# Patient Record
Sex: FEMALE | Race: WHITE | Employment: FULL TIME | URBAN - METROPOLITAN AREA
[De-identification: names, ages, dates, MRNs, and addresses within clinical notes are randomized per-mention and may not be internally consistent; named-entity substitution may affect disease eponyms.]

---

## 2017-01-13 ENCOUNTER — THERAPY VISIT (OUTPATIENT)
Dept: PHYSICAL THERAPY | Facility: CLINIC | Age: 32
End: 2017-01-13
Payer: COMMERCIAL

## 2017-01-13 DIAGNOSIS — N94.10 DYSPAREUNIA, FEMALE: Primary | ICD-10-CM

## 2017-01-13 DIAGNOSIS — M99.05 SOMATIC DYSFUNCTION OF PELVIC REGION: ICD-10-CM

## 2017-01-13 DIAGNOSIS — N39.3 FEMALE STRESS INCONTINENCE: ICD-10-CM

## 2017-01-13 PROCEDURE — 97161 PT EVAL LOW COMPLEX 20 MIN: CPT | Mod: GP | Performed by: PHYSICAL THERAPIST

## 2017-01-13 PROCEDURE — 97530 THERAPEUTIC ACTIVITIES: CPT | Mod: GP | Performed by: PHYSICAL THERAPIST

## 2017-01-13 NOTE — PROGRESS NOTES
[facility]  System  Physical Exam  General   ROS            Physical Therapy Initial Examination/Evaluation January 13, 2017   Ceci Major is a 31 year old female referred to physical therapy by Dr. Camilla Weber for treatment of dyspareunia with Precautions/Restrictions/MD instructions E&T; 6 visits   Therapist Assessment:   Clinical Impression: Pt presents to [facility] [place] with primary complaint of pelvic pain and occasional incontinence.  Per clinical examination, pt with decreased power and endurance in pelvic floor.  Compensation required from adductors, glutes, and abdominals to complete a pelvic floor contraction.  Reproduction of pain present with internal exam.  Evaluation ongoing as pt unaware of 80 minute session time and needing to return to work  Pt will benefit from skilled physical therapy for stretching and strengthening program as appropriate as well as core stabilization program post partum.      Subjective: Pelvic pain during exercise, especially with wide leg movements. Pain during intercourse, unable to successfully complete. Was able to have orgasm with some muscular pain and soreness following.   DOI/onset: 1/13/17 (PT) 1/9/17 (MD)   Mechanism of injury: Child birth    Related PMH: Chronic neck issues that also worsened during preganancy; some low back pain prior to preganancy Previous treatment: chiropractic   Chief Complaint: pelvic pain    Pain: rest 0 /10, activity 7/10  Described as: Burning Alleviated by: Stopping the activty Progression of symptoms since initial onset: Worse Time of day when pain is worse: none in particular   Sleeping: No issues; just getting up with the baby   Social history: Lives with  and daugther; returned to work part-time this week from maternity leave   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 No    Do you leak with cough,sneeze, jumping, running?Yes   Any other activities that cause leaking? No   Do you have triggers that make you feel you can't wait to go to the bathroom? No     Type of pad and number used per day?  0  When you leak what is the amount? Small     How long can you delay the need to urinate? Not sure; 1 hour.   How many times do you get up to urinate at night? 0     Can you stop the flow of urine when on the toilet? Unsure  Is the volume of urine passed usually: medium. (8sec rule=  250ml with average bladder storing  400-600ml)    Do you strain to pass urine? Sometimes  Do you have a slow or hesitant urinary stream? No  Do you have difficulty initiating the urine stream? Yes  Is urination painful?  No    How many bladder infections have you had in last 12 months? 0    Fluid intake(one glass is 8oz or one cup) 6 glasses/day, 0 caffinated glasses/day  0 alcohol glasses/day.    Bowel habits:  Frequency of bowel movements? 1 times a day  Consistancy of stool? hard, [place] Stool Scale 1  Do you ignore the urge to defecate? No  Do you strain to pass stool?  Sometimes    Pelvic Pain:  Do you have any pelvic pain with intercourse, exams, use of tampons? Yes  Is initial penetration during intercourse painful? Yes  Is deeper penetration painful? Unable to get to deeper penetration since childbirth  Do you use lubricant?   Yes What kind? Uberlube      Given birth? Yes Any complications? Yes; traumatic delivery, occurred quickly; did get epidural. Pushed constantly for 15 minutes but unable to feel due to epidural  # of vaginal delieveries? 1   # of C-sections? 0  # of episiotomies?  1.  Are you sexually active? Trying to be, but unable due to increased pain   Have you ever been worried for your physical safety? No  Any abdominal or pelvic surgeries?  No   Are you having any regular exercise? Yes  Have you practiced the PF(kegel) exercises for 4 or more weeks? No   Changed diet lately? No    OBSERVATION  Lumbar Posture: Negative  Introitus: Scar able to be seen from episiotomy      MUSCLE PERFORMANCE    Baseline PF tone:hypo  PF Tone with cough: bulge  Valsalva: bulge  PF Response quality: sluggish  PF Power: Center: 2   Endurance: Maximum contraction in seconds: 3-5s but with compensation from abdominals, glutes, adductors  # of endurance contractions before fatigue: NT  Quick contraction repetitions prior to fatigue: 5 with compensation   Specificity/accessory muscles: Glutes, abdominals, adductors      PALPATION: Reproduction of symptoms with digital evaluations; increased hypertonicity on the L; pain at both superficial and deep layers    Therapeutic Activity (20 min): Today's session consisted of education regarding pelvic floor muscle anatomy, normal bladder function, urge suppression techniques and/or relaxation techniques as indicated.  Pt was instructed in the pathoanatomy of the pelvic floor utilizing pelvic model.  We discussed what pelvic floor physical therapy is, components of exam, and typical patient progression.  Pt was told that she was in control of exam progression, and if at any time was uncomfortable and wished to discontinue we could.     Assessment/Plan:      Patient is a 31 year old female with pelvic complaints.    Patient has the following significant findings with corresponding treatment plan.                Diagnosis 1:  Dyspareunia, pelvic floor dysfunction  Pain -  manual therapy, education and home program  Decreased ROM/flexibility - manual therapy, therapeutic exercise and home program  Decreased strength - therapeutic exercise, therapeutic activities and home  program  Impaired muscle performance - biofeedback, neuro re-education and home program  Decreased function - therapeutic activities and home program    Therapy Evaluation Codes:   1) History comprised of:   Personal factors that impact the plan of care:      Past/current experiences and Time since onset of symptoms.    Comorbidity factors that impact the plan of care are:      Bowel/bladder changes, Implanted device and Weakness.     Medications impacting care: None.  2) Examination of Body Systems comprised of:   Body structures and functions that impact the plan of care:      Pelvis.   Activity limitations that impact the plan of care are:      Sports, Squatting/kneeling, Margaret and Stress incontinence.  3) Clinical presentation characteristics are:   Stable/Uncomplicated.  4) Decision-Making    Low complexity using standardized patient assessment instrument and/or measureable assessment of functional outcome.  Cumulative Therapy Evaluation is: Low complexity.    Previous and current functional limitations:  (See Goal Flow Sheet for this information)    Short term and Long term goals: (See Goal Flow Sheet for this information)     Communication ability:  Patient appears to be able to clearly communicate and understand verbal and written communication and follow directions correctly.  Treatment Explanation - The following has been discussed with the patient:   RX ordered/plan of care  Anticipated outcomes  Possible risks and side effects  This patient would benefit from PT intervention to resume normal activities.   Rehab potential is good.    Frequency:  1 X week, once daily  Duration:  for 6 weeks  Discharge Plan:  Achieve all LTG.  Independent in home treatment program.  Reach maximal therapeutic benefit.    Please refer to the daily flowsheet for treatment today, total treatment time and time spent performing 1:1 timed codes.

## 2017-01-14 PROBLEM — M99.05 SOMATIC DYSFUNCTION OF PELVIC REGION: Status: ACTIVE | Noted: 2017-01-14

## 2017-01-14 PROBLEM — N94.10 DYSPAREUNIA, FEMALE: Status: ACTIVE | Noted: 2017-01-14

## 2017-01-14 PROBLEM — N39.3 FEMALE STRESS INCONTINENCE: Status: ACTIVE | Noted: 2017-01-14

## 2017-01-20 ENCOUNTER — THERAPY VISIT (OUTPATIENT)
Dept: PHYSICAL THERAPY | Facility: CLINIC | Age: 32
End: 2017-01-20
Payer: COMMERCIAL

## 2017-01-20 DIAGNOSIS — N94.10 DYSPAREUNIA, FEMALE: ICD-10-CM

## 2017-01-20 DIAGNOSIS — N39.3 FEMALE STRESS INCONTINENCE: Primary | ICD-10-CM

## 2017-01-20 DIAGNOSIS — M99.05 SOMATIC DYSFUNCTION OF PELVIC REGION: ICD-10-CM

## 2017-01-20 PROCEDURE — 97112 NEUROMUSCULAR REEDUCATION: CPT | Mod: GP | Performed by: PHYSICAL THERAPIST

## 2017-01-20 PROCEDURE — 97140 MANUAL THERAPY 1/> REGIONS: CPT | Mod: GP | Performed by: PHYSICAL THERAPIST

## 2017-01-27 ENCOUNTER — THERAPY VISIT (OUTPATIENT)
Dept: PHYSICAL THERAPY | Facility: CLINIC | Age: 32
End: 2017-01-27
Payer: COMMERCIAL

## 2017-01-27 DIAGNOSIS — N39.3 FEMALE STRESS INCONTINENCE: ICD-10-CM

## 2017-01-27 DIAGNOSIS — M99.05 SOMATIC DYSFUNCTION OF PELVIC REGION: Primary | ICD-10-CM

## 2017-01-27 DIAGNOSIS — N94.10 DYSPAREUNIA, FEMALE: ICD-10-CM

## 2017-01-27 PROCEDURE — 97112 NEUROMUSCULAR REEDUCATION: CPT | Mod: GP | Performed by: PHYSICAL THERAPIST

## 2017-01-27 PROCEDURE — 97140 MANUAL THERAPY 1/> REGIONS: CPT | Mod: GP | Performed by: PHYSICAL THERAPIST

## 2017-02-03 ENCOUNTER — THERAPY VISIT (OUTPATIENT)
Dept: PHYSICAL THERAPY | Facility: CLINIC | Age: 32
End: 2017-02-03
Payer: COMMERCIAL

## 2017-02-03 DIAGNOSIS — N39.3 FEMALE STRESS INCONTINENCE: ICD-10-CM

## 2017-02-03 DIAGNOSIS — N94.10 DYSPAREUNIA, FEMALE: ICD-10-CM

## 2017-02-03 DIAGNOSIS — M99.05 SOMATIC DYSFUNCTION OF PELVIC REGION: Primary | ICD-10-CM

## 2017-02-03 PROCEDURE — 97112 NEUROMUSCULAR REEDUCATION: CPT | Mod: GP | Performed by: PHYSICAL THERAPIST

## 2017-02-03 PROCEDURE — 97140 MANUAL THERAPY 1/> REGIONS: CPT | Mod: GP | Performed by: PHYSICAL THERAPIST

## 2017-02-16 ENCOUNTER — THERAPY VISIT (OUTPATIENT)
Dept: PHYSICAL THERAPY | Facility: CLINIC | Age: 32
End: 2017-02-16
Payer: COMMERCIAL

## 2017-02-16 DIAGNOSIS — N94.10 DYSPAREUNIA, FEMALE: Primary | ICD-10-CM

## 2017-02-16 DIAGNOSIS — N39.3 FEMALE STRESS INCONTINENCE: ICD-10-CM

## 2017-02-16 DIAGNOSIS — M99.05 SOMATIC DYSFUNCTION OF PELVIC REGION: ICD-10-CM

## 2017-02-16 PROCEDURE — 97140 MANUAL THERAPY 1/> REGIONS: CPT | Mod: GP | Performed by: PHYSICAL THERAPIST

## 2017-02-16 PROCEDURE — 97112 NEUROMUSCULAR REEDUCATION: CPT | Mod: GP | Performed by: PHYSICAL THERAPIST

## 2017-03-01 ENCOUNTER — OFFICE VISIT (OUTPATIENT)
Dept: URGENT CARE | Facility: URGENT CARE | Age: 32
End: 2017-03-01
Payer: COMMERCIAL

## 2017-03-01 VITALS
SYSTOLIC BLOOD PRESSURE: 100 MMHG | WEIGHT: 150.8 LBS | TEMPERATURE: 97.5 F | OXYGEN SATURATION: 98 % | DIASTOLIC BLOOD PRESSURE: 58 MMHG | HEART RATE: 54 BPM | BODY MASS INDEX: 25.09 KG/M2

## 2017-03-01 DIAGNOSIS — J20.9 ACUTE BRONCHITIS, UNSPECIFIED ORGANISM: ICD-10-CM

## 2017-03-01 DIAGNOSIS — R05.9 COUGH: ICD-10-CM

## 2017-03-01 DIAGNOSIS — J00 ACUTE RHINITIS: Primary | ICD-10-CM

## 2017-03-01 PROCEDURE — 99213 OFFICE O/P EST LOW 20 MIN: CPT | Performed by: FAMILY MEDICINE

## 2017-03-01 RX ORDER — AMOXICILLIN 875 MG
875 TABLET ORAL 2 TIMES DAILY
Qty: 20 TABLET | Refills: 0 | Status: SHIPPED | OUTPATIENT
Start: 2017-03-01 | End: 2017-03-11

## 2017-03-01 NOTE — MR AVS SNAPSHOT
After Visit Summary   3/1/2017    Ceci Major    MRN: [id number]           Patient Information     Date Of Birth          1985        Visit Information        Provider Department      3/1/2017 9:15 AM Tye Moise MD [facility]        Today's Diagnoses     Acute rhinitis    -  1    Cough        Acute bronchitis, unspecified organism          Care Instructions    Steam, Humidifier for the nose, sinuses and lungs    Fill the prescription for Amoxicillin if your sinus symptoms fail to improve in 3-4 days.     Saline nasal rinses     Drink plenty of water    Dextromethorphan cough syrup for cough    Go to the ER if you develop worsening shortness of breath, increasing fevers.      Follow up with your primary care provider if not better in 10 days.           Follow-ups after your visit        Your next 10 appointments already scheduled     Mar 10, 2017  8:10 AM CST   MAGDALENO For Women Only with Lara Hargrove PT   [facility] ([facility])    [address] [place] [id number] [phone]              Who to contact     If you have questions or need follow up information about today's clinic visit or your schedule please contact [facility] directly at [phone].  Normal or non-critical lab and imaging results will be communicated to you by MyChart, letter or phone within 4 business days after the clinic has received the results. If you do not hear from us within 7 days, please contact the clinic through MyChart or phone. If you have a critical or abnormal lab result, we will notify you by phone as soon as possible.  Submit refill requests through Storie or call your pharmacy and they will forward the refill request to us. Please allow 3 business days for your refill to be completed.          Additional Information About Your Visit        [employer] Information     Storie lets you send  "messages to your doctor, view your test results, renew your prescriptions, schedule appointments and more. To sign up, go to [email or web address] . Click on \"Log in\" on the left side of the screen, which will take you to the Welcome page. Then click on \"Sign up Now\" on the right side of the page.     You will be asked to enter the access code listed below, as well as some personal information. Please follow the directions to create your username and password.     Your access code is: [email or web address]  Expires: 2017  2:49 PM     Your access code will  in 90 days. If you need help or a new code, please call your [place] clinic or [phone].        Care EveryWhere ID     This is your Care EveryWhere ID. This could be used by other organizations to access your [place] medical records  [id number]        Your Vitals Were     Pulse Temperature Pulse Oximetry Breastfeeding? BMI (Body Mass Index)       54 97.5  F (36.4  C) (Tympanic) 98% Yes 25.09 kg/m2        Blood Pressure from Last 3 Encounters:   17 100/58   14 102/67    Weight from Last 3 Encounters:   17 150 lb 12.8 oz (68.4 kg)   14 153 lb 12.8 oz (69.8 kg)              Today, you had the following     No orders found for display         Today's Medication Changes          These changes are accurate as of: 3/1/17  9:49 AM.  If you have any questions, ask your nurse or doctor.               Start taking these medicines.        Dose/Directions    amoxicillin 875 MG tablet   Commonly known as:  AMOXIL   Used for:  Acute rhinitis   Started by:  Tye Moise MD        Dose:  875 mg   Take 1 tablet (875 mg) by mouth 2 times daily for 10 days   Quantity:  20 tablet   Refills:  0            Where to get your medicines      Some of these will need a paper prescription and others can be bought over the counter.  Ask your nurse if you have questions.     Bring a paper prescription for each of these medications     amoxicillin 875 MG " tablet                Primary Care Provider Office Phone # Fax #    Camilla Weber [phone]       [facility] [address] [place] [address]        Thank you!     Thank you for choosing [facility]  for your care. Our goal is always to provide you with excellent care. Hearing back from our patients is one way we can continue to improve our services. Please take a few minutes to complete the written survey that you may receive in the mail after your visit with us. Thank you!             Your Updated Medication List - Protect others around you: Learn how to safely use, store and throw away your medicines at www.disposemymeds.org.          This list is accurate as of: 3/1/17  9:49 AM.  Always use your most recent med list.                   Brand Name Dispense Instructions for use    adapalene 0.1 % cream    DIFFERIN    45 g    Apply topically At Bedtime       amoxicillin 875 MG tablet    AMOXIL    20 tablet    Take 1 tablet (875 mg) by mouth 2 times daily for 10 days       Ciclopirox 8 % Kit    CICLOPIROX TREATMENT    1 kit    Externally apply 1 Application topically At Bedtime       clindamycin 1 % lotion    CLEOCIN T    60 mL    Apply topically daily       hydrocortisone 0.2 % cream    WESTCORT    45 g    Apply topically 2 times daily Up to 2 weeks to red areas on upper arms, up to 2 weeks.       PRENATAL ADVANTAGE PO      Take by mouth daily       tretinoin 0.05 % cream    RETIN-A    45 g    Spread a pea size amount into affected area topically at bedtime.  Use sunscreen SPF>20.

## 2017-03-01 NOTE — PATIENT INSTRUCTIONS
Steam, Humidifier for the nose, sinuses and lungs    Fill the prescription for Amoxicillin if your sinus symptoms fail to improve in 3-4 days.     Saline nasal rinses     Drink plenty of water    Dextromethorphan cough syrup for cough    Go to the ER if you develop worsening shortness of breath, increasing fevers.      Follow up with your primary care provider if not better in 10 days.

## 2017-03-01 NOTE — PROGRESS NOTES
SUBJECTIVE:   Ceci Major is a 31 year old female--she currently breastfeeds-- presenting with a chief complaint of cough (sometimes productive of green-yellow phlegm, sometimes clear phlegm, severe coughing attacks), blood in the nose (sometimes), fevers up to 100.5 F.  No h/o lung problems.    Onset of symptoms was two days ago for the cough and fevers, one week for the other symptoms. .    Course of illness is still present. .      Current and Associated symptoms: as listed above.   Treatment measures tried include Advil (last taken today at 6:30 am).  Predisposing factors include patient's daughter was diagnosed with pneumonia two .    Past Medical History   Diagnosis Date     IUD (intrauterine device) in place      Mirena.     Current Outpatient Prescriptions   Medication Sig Dispense Refill     Prenatal Vit-DSS-Fe Cbn-FA (PRENATAL ADVANTAGE PO) Take by mouth daily       clindamycin (CLEOCIN T) 1 % lotion Apply topically daily (Patient not taking: Reported on 3/1/2017) 60 mL 3     hydrocortisone (WESTCORT) 0.2 % cream Apply topically 2 times daily Up to 2 weeks to red areas on upper arms, up to 2 weeks. (Patient not taking: Reported on 3/1/2017) 45 g 3     tretinoin (RETIN-A) 0.05 % cream Spread a pea size amount into affected area topically at bedtime.  Use sunscreen SPF>20. (Patient not taking: Reported on 3/1/2017) 45 g 11     Ciclopirox (CICLOPIROX TREATMENT) 8 % KIT Externally apply 1 Application topically At Bedtime (Patient not taking: Reported on 3/1/2017) 1 kit 11     adapalene (DIFFERIN) 0.1 % cream Apply topically At Bedtime (Patient not taking: Reported on 3/1/2017) 45 g 3     Social History   Substance Use Topics     Smoking status: Never Smoker     Smokeless tobacco: Never Used     Alcohol use Yes      Comment: 6 drinks per week       ROS:  Review of systems negative except as stated above.    OBJECTIVE  :/58 (BP Location: Right arm, Patient Position: Chair, Cuff Size: Adult  Regular)  Pulse 54  Temp 97.5  F (36.4  C) (Tympanic)  Wt 150 lb 12.8 oz (68.4 kg)  SpO2 98%  Breastfeeding? Yes  BMI 25.09 kg/m2  GENERAL APPEARANCE: healthy, alert and no distress.  No acute respiratory distress.    HENT: TM's normal bilaterally, nasal turbinates erythematous, swollen and oral mucous membranes moist, no erythema noted  NECK: supple, nontender, no lymphadenopathy  RESP: lungs clear to auscultation - no rales, rhonchi or wheezes  CV: regular rates and rhythm, normal S1 S2, no murmur noted  SKIN: no suspicious lesions or rashes    ASSESSMENT:  Cough  Rhinitis  Acute Bronchitis    PLAN:  Steam, Humidifier  Dextromethorphan syrup for cough  Fill a printed prescription for Amoxicillin if the sinus symptoms fail to improve in 3-4 days.   Go to the emergency room if there is worsening shortness of breath and worsening fever.   follow up with the primary care provider if not better in 10 days.   See orders in Epic    Tye Moise MD

## 2017-03-01 NOTE — NURSING NOTE
"Chief Complaint   Patient presents with     Urgent Care     Cough     Pt states has cough and blood in nose x 3 days. States daughter was just diagnosed with pneumonia. Pt has taken advil.        Initial /58 (BP Location: Right arm, Patient Position: Chair, Cuff Size: Adult Regular)  Pulse 54  Temp 97.5  F (36.4  C) (Tympanic)  Wt 150 lb 12.8 oz (68.4 kg)  SpO2 98%  Breastfeeding? Yes  BMI 25.09 kg/m2 Estimated body mass index is 25.09 kg/(m^2) as calculated from the following:    Height as of 3/5/14: 5' 5\" (1.651 m).    Weight as of this encounter: 150 lb 12.8 oz (68.4 kg).  Medication Reconciliation: unable or not appropriate to perform   Marissa Quintanilla CMA (AAMA) 3/1/2017 9:21 AM    "

## 2017-03-10 ENCOUNTER — THERAPY VISIT (OUTPATIENT)
Dept: PHYSICAL THERAPY | Facility: CLINIC | Age: 32
End: 2017-03-10
Payer: COMMERCIAL

## 2017-03-10 DIAGNOSIS — M99.05 SOMATIC DYSFUNCTION OF PELVIC REGION: ICD-10-CM

## 2017-03-10 DIAGNOSIS — N94.10 DYSPAREUNIA, FEMALE: ICD-10-CM

## 2017-03-10 DIAGNOSIS — N39.3 FEMALE STRESS INCONTINENCE: ICD-10-CM

## 2017-03-10 PROCEDURE — 97112 NEUROMUSCULAR REEDUCATION: CPT | Mod: GP | Performed by: PHYSICAL THERAPIST

## 2017-03-10 PROCEDURE — 97140 MANUAL THERAPY 1/> REGIONS: CPT | Mod: GP | Performed by: PHYSICAL THERAPIST

## 2017-03-10 NOTE — MR AVS SNAPSHOT
"              After Visit Summary   3/10/2017    Ceci Major    MRN: [id number]           Patient Information     Date Of Birth          1985        Visit Information        Provider Department      3/10/2017 8:10 AM Lara Hargrove PT [facility]        Today's Diagnoses     Somatic dysfunction of pelvic region        Female stress incontinence        Dyspareunia, female           Follow-ups after your visit        Your next 10 appointments already scheduled     Apr 14, 2017  8:10 AM CDT   MAGDALENO For Women Only with Lara Hargrove PT   [facility]    [address] [place] [id number] [phone]              Who to contact     If you have questions or need follow up information about today's clinic visit or your schedule please contact [facility] directly at [phone].  Normal or non-critical lab and imaging results will be communicated to you by MyChart, letter or phone within 4 business days after the clinic has received the results. If you do not hear from us within 7 days, please contact the clinic through [employer] or phone. If you have a critical or abnormal lab result, we will notify you by phone as soon as possible.  Submit refill requests through Going or call your pharmacy and they will forward the refill request to us. Please allow 3 business days for your refill to be completed.          Additional Information About Your Visit        [employer] Information     Going lets you send messages to your doctor, view your test results, renew your prescriptions, schedule appointments and more. To sign up, go to [email or web address] . Click on \"Log in\" on the left side of the screen, which will take you to the Welcome page. Then click on \"Sign up Now\" on the right side of the page.     You will be asked to enter the access code listed " below, as well as some personal information. Please follow the directions to create your username and password.     Your access code is: [email or web address]  Expires: 2017  2:49 PM     Your access code will  in 90 days. If you need help or a new code, please call your [place] clinic or [phone].        Care EveryWhere ID     This is your Care EveryWhere ID. This could be used by other organizations to access your [place] medical records  [id number]         Blood Pressure from Last 3 Encounters:   17 100/58   14 102/67    Weight from Last 3 Encounters:   17 68.4 kg (150 lb 12.8 oz)   14 69.8 kg (153 lb 12.8 oz)              We Performed the Following     MAGDALENO PROGRESS NOTES REPORT     MANUAL THER TECH,1+REGIONS,EA 15 MIN     NEUROMUSCULAR RE-EDUCATION        Primary Care Provider Office Phone # Fax #    Camilla Weber [phone]       [facility] [address] [facility] [address]        Thank you!     Thank you for choosing [facility] [place]  for your care. Our goal is always to provide you with excellent care. Hearing back from our patients is one way we can continue to improve our services. Please take a few minutes to complete the written survey that you may receive in the mail after your visit with us. Thank you!             Your Updated Medication List - Protect others around you: Learn how to safely use, store and throw away your medicines at www.disposemymeds.org.          This list is accurate as of: 3/10/17 11:59 PM.  Always use your most recent med list.                   Brand Name Dispense Instructions for use    adapalene 0.1 % cream    DIFFERIN    45 g    Apply topically At Bedtime       amoxicillin 875 MG tablet    AMOXIL    20 tablet    Take 1 tablet (875 mg) by mouth 2 times daily for 10 days       Ciclopirox 8 % Kit    CICLOPIROX TREATMENT    1 kit    Externally apply 1 Application topically At Bedtime        clindamycin 1 % lotion    CLEOCIN T    60 mL    Apply topically daily       hydrocortisone 0.2 % cream    WESTCORT    45 g    Apply topically 2 times daily Up to 2 weeks to red areas on upper arms, up to 2 weeks.       PRENATAL ADVANTAGE PO      Take by mouth daily       tretinoin 0.05 % cream    RETIN-A    45 g    Spread a pea size amount into affected area topically at bedtime.  Use sunscreen SPF>20.

## 2017-03-10 NOTE — LETTER
"[facility] PHYSICAL THERAPY [place]  [address]  [address]  [facility] [address]  [phone]    March 15, 2017    Re: Ceci Major   :   1985  MRN:  [id number]   REFERRING PHYSICIAN:   Camilla Weber    [facility] PHYSICAL [facility]    Date of Initial Evaluation:  2017  Visits:  Rxs Used: 6  Reason for Referral:     Somatic dysfunction of pelvic region  Female stress incontinence  Dyspareunia, female    EVALUATION SUMMARY      PROGRESS  REPORT    Progress reporting period is from 2017 to 3/10/2017.       SUBJECTIVE  Subjective: Pt reports pain continues to improve. She has been able to have intercourse. It's not pleasureable but \"tolerable\". Stretching and STM does help but is difficult to complete at home. She has returned to some of her normal exercise classes, which she feels will help improve her strength.  Current pain level is 3/10 with intercourse   Initial Pain level: 7/10.   Changes in function:  Yes   Adverse reaction to treatment or activity: None    OBJECTIVE  Changes noted in objective findings: improving neuromuscular control of pelvic floor, improved pain with intercourse and exercise  Objective: STM to pelvic floor, Strength of 2/5 in pelvic floor, discussed progression of exercises for pelvic floor/core     ASSESSMENT/PLAN  Updated problem list and treatment plan: Diagnosis 1:  Pelvic pain, pelvic floor dysfunction  Pain -  manual therapy, education and home program  Decreased ROM/flexibility - manual therapy, therapeutic exercise and home program  Decreased strength - therapeutic exercise, therapeutic activities and home program  Impaired muscle performance - biofeedback, neuro re-education and home program  STG/LTGs have been met or progress has been made towards goals:  Yes (See Goal flow sheet completed today.)  Assessment of Progress: The patient's condition is improving.  Self Management Plans:  Patient has been " instructed in a home treatment program.  Ceci Major     I have re-evaluated this patient and find that the nature, scope, duration and intensity of the therapy is appropriate for the medical condition of the patient.  Ceci continues to require the following intervention to meet STG and LTG's:  PT    Recommendations:  This patient would benefit from continued therapy.     Frequency:  1 X/ 4 weeks, once daily  Duration:  for 8 weeks (total of 2 more sessions)    Please refer to the daily flowsheet for treatment today, total treatment time and time spent performing 1:1 timed codes.            Thank you for your referral.    INQUIRIES  Therapist: Lara Hargrove, PT, DPT  [facility] [address] [address]  Phone: [phone]  Fax: [phone]

## 2017-03-11 NOTE — PROGRESS NOTES
"  [facility]  System  Physical Exam  General   ROS      PROGRESS  REPORT    Progress reporting period is from 1/13/2017 to 3/10/2017.       SUBJECTIVE  Subjective: Pt reports pain continues to improve. She has been able to have intercourse. It's not pleasureable but \"tolerable\". Stretching and STM does help but is difficult to complete at home. She has returned to some of her normal exercise classes, which she feels will help improve her strength.  Current pain level is 3/10 with intercourse   Initial Pain level: 7/10.   Changes in function:  Yes   Adverse reaction to treatment or activity: None    OBJECTIVE  Changes noted in objective findings: improving neuromuscular control of pelvic floor, improved pain with intercourse and exercise  Objective: STM to pelvic floor, Strength of 2/5 in pelvic floor, discussed progression of exercises for pelvic floor/core     ASSESSMENT/PLAN  Updated problem list and treatment plan: Diagnosis 1:  Pelvic pain, pelvic floor dysfunction  Pain -  manual therapy, education and home program  Decreased ROM/flexibility - manual therapy, therapeutic exercise and home program  Decreased strength - therapeutic exercise, therapeutic activities and home program  Impaired muscle performance - biofeedback, neuro re-education and home program  STG/LTGs have been met or progress has been made towards goals:  Yes (See Goal flow sheet completed today.)  Assessment of Progress: The patient's condition is improving.  Self Management Plans:  Patient has been instructed in a home treatment program.  I have re-evaluated this patient and find that the nature, scope, duration and intensity of the therapy is appropriate for the medical condition of the patient.  Ceci continues to require the following intervention to meet STG and LTG's:  PT    Recommendations:  This patient would benefit from continued therapy.     Frequency:  1 X/ 4 weeks, once daily  Duration:  for 8 weeks (total of 2 more sessions)    Please " refer to the daily flowsheet for treatment today, total treatment time and time spent performing 1:1 timed codes.

## 2017-04-11 ENCOUNTER — THERAPY VISIT (OUTPATIENT)
Dept: OCCUPATIONAL THERAPY | Facility: CLINIC | Age: 32
End: 2017-04-11
Payer: COMMERCIAL

## 2017-04-11 DIAGNOSIS — M65.4 RADIAL STYLOID TENOSYNOVITIS: ICD-10-CM

## 2017-04-11 DIAGNOSIS — M77.8 LEFT WRIST TENDONITIS: ICD-10-CM

## 2017-04-11 DIAGNOSIS — M25.532 LEFT WRIST PAIN: Primary | ICD-10-CM

## 2017-04-11 PROCEDURE — 97165 OT EVAL LOW COMPLEX 30 MIN: CPT | Mod: GO | Performed by: OCCUPATIONAL THERAPIST

## 2017-04-11 PROCEDURE — 29125 APPL SHORT ARM SPLINT STATIC: CPT | Mod: GO | Performed by: OCCUPATIONAL THERAPIST

## 2017-04-11 NOTE — PROGRESS NOTES
Hand Therapy Initial Evaluation    Current Date:  2017    Diagnosis:   Left DeQuervain's tenosynovitis  DOI:  2016  Pt had an injection around 17 which she notes did not provide relief.  Referring MD: Dr. Church (orders 3/24/17)  Next MD visit: PRN    Subjective:  Ceci Major is a 31 year old right hand dominant female.  She has  a month old daughter named Radha, her first child.    Patient reports symptoms of pain, stiffness/loss of motion, weakness/loss of strength and edema of the left wrist which occurred due to onset with  - . Since onset symptoms are Gradually getting worse..  Special tests:  none.  Previous treatment: rest.    General health as reported by patient is excellent.      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 She has a 6 month old baby - Radha. She lives with her . She notes that it is getting tougher on the wrist since Radha is growing and becoming heavier and more wiggly.  2017  Functional Outcome Measure:  Upper Extremity Functional Index Score:  Please refer to flow sheet.        Objective:   Observation: mild swelling noted to L wrist, radially.    PAIN 2017     Location L radial wrist into thumb     Description sharp and shooting     Radiates No     Worse d/n daytime and nighttime     Frequency activity dependant     Exacerbated by , resting wrist or using wrist in awkward positions     Relieves rest     At rest 0-10/10 0/10     On use 0-10/10 3/10     At worst.0-10/10 7/10     Sleep disruption? yes     Progression Gradually worsening         Functional Exam:  - no pain, + mild, ++ moderate, +++ severe  ROM:  Wrist 2017    AROM(PROM) right left   Extension 70 70   Flexion 72 68   RD 22 29+   UD 50 47   UD with th  flex 27 25 (thumb on top of fist)       Thumb     AROM(PROM) Right Left   MP     IP     RAbd     PAbd     Retropulsion     Kapandji Opposition Scale (0-10/10)       Strength:  deferred    MMT:  Date 4/11/2017     right left   Thumb EPL  WNL WNL   Thumb EPB  WNL 3/5 pain with resist   Thumb APL WNL 3+/5   Wrist Ext WNL WNL   Wrist Flex  WNL WNL     Palpation:  Date 4/11/2017    L   Radial Styloid ++   1st dorsal comp. ++     Special Tests:    Date 4/11/2017    L   Finkelstein  +   Radial Nerve Tinels -       Sensation:  WNL throughout all nerve distributions; per patient report    Assessment:  Patient presents with symptoms consistent with diagnosis of L DeQuervain's tendonitis, with trial of injection a week ago, and now she would like to trial conservative intervention.     Patient's limitations or Problem List includes:  Pain, Decreased ROM/motion and Increased edema of the left wrist and thumb which interferes with the patient's ability to perform Self Care Tasks (dressing),  and Sleep Patterns as compared to previous level of function.    Rehab Potential:  Excellent - Return to full activity, no limitations    Patient will benefit from skilled Occupational Therapy to increase flexibility and overall strength and decrease pain and edema to return to previous activity level and resume normal daily tasks and to reach their rehab potential.    Barriers to Learning:  No barrier    Communication Issues:  Patient appears to be able to clearly communicate and understand verbal and written communication and follow directions correctly.    Assessment of Occupational Performance:  3-5 Performance Deficits  Identified Performance Deficits: dressing, care of others, health management and maintenance, meal preparation and cleanup, sleep and work      Clinical Decision Making (Complexity): Low complexity  Simple history review with patient      Treatment Explanation:  The following has been discussed with the  patient:    RX ordered/plan of care  Anticipated outcomes  Possible risks and side effects    Plan:  Frequency:  2 X a month, once daily (PT also seeing PT for other concerns)  Duration:  for 3   months    Treatment Plan:    Modalities:    US, Fluidotherapy and Paraffin   Therapeutic Exercise:   AROM of wrist and thumb  PROM with stretch to wrist and thumb extensors   Manual Techniques:   Friction massage over 1st dorsal compartment  Myofascial release of the thumb extensors and flexors  Neuromuscular:   Radial nerve gliding   Kinesiotaping  Orthotics:    Forearm based thumb spica orthosis  Self Care:   Ergonomic consideration   Diagnostic education  Discharge Plan:    Achieve all LTG.  Independent in home treatment program.  Reach maximal therapeutic benefit.    Home Program:     Self TFM to 1st dorsal compartment    PROM with stretch into wrist ulnar deviation  [place] L thumb spica orthosis sleeping and per symptoms day  Avoid activities that exacerbate pain in the wrist or thumb   handout provided  KT to radial wrist    Next Visit:  Progress per plan, add modality (uls)    Self MFR to EPB/ABL  AROM of wrist and thumb

## 2017-04-14 ENCOUNTER — THERAPY VISIT (OUTPATIENT)
Dept: PHYSICAL THERAPY | Facility: CLINIC | Age: 32
End: 2017-04-14
Payer: COMMERCIAL

## 2017-04-14 DIAGNOSIS — N94.10 DYSPAREUNIA, FEMALE: ICD-10-CM

## 2017-04-14 DIAGNOSIS — M99.05 SOMATIC DYSFUNCTION OF PELVIC REGION: ICD-10-CM

## 2017-04-14 DIAGNOSIS — N39.3 FEMALE STRESS INCONTINENCE: ICD-10-CM

## 2017-04-14 PROCEDURE — 97110 THERAPEUTIC EXERCISES: CPT | Mod: GP | Performed by: PHYSICAL THERAPIST

## 2017-04-14 PROCEDURE — 97112 NEUROMUSCULAR REEDUCATION: CPT | Mod: GP | Performed by: PHYSICAL THERAPIST

## 2017-04-14 NOTE — MR AVS SNAPSHOT
After Visit Summary   4/14/2017    Ceci Major    MRN: [id number]           Patient Information     Date Of Birth          1985        Visit Information        Provider Department      4/14/2017 8:10 AM Lara Hargrove PT [facility]        Today's Diagnoses     Somatic dysfunction of pelvic region        Female stress incontinence        Dyspareunia, female           Follow-ups after your visit        Your next 10 appointments already scheduled     Apr 27, 2017  8:00 AM CDT   [facility] Hand with Lona Beatty OT   [facility] [place] [facility]    [address] [place] [address] [phone]            May 11, 2017  8:00 AM CDT   [facility] Hand with Lona Beatty OT   [facility] [place] [facility]    [address] [place] [address] [phone]              Who to contact     If you have questions or need follow up information about today's clinic visit or your schedule please contact [facility] directly at [phone].  Normal or non-critical lab and imaging results will be communicated to you by [employer]hart, letter or phone within 4 business days after the clinic has received the results. If you do not hear from us within 7 days, please contact the clinic through Gur[employer]ook[employer] or phone. If you have a critical or abnormal lab result, we will notify you by phone as soon as possible.  Submit refill requests through [employer] or call your pharmacy and they will forward the refill request to us. Please allow 3 business days for your refill to be completed.          Additional Information About Your Visit        [email or web address][email or web address] Information     [employer] lets you send messages to your doctor, view your test results, renew your prescriptions, schedule appointments and more. To sign up, go to [email or web address] . Click on  "\"Log in\" on the left side of the screen, which will take you to the Welcome page. Then click on \"Sign up Now\" on the right side of the page.     You will be asked to enter the access code listed below, as well as some personal information. Please follow the directions to create your username and password.     Your access code is: [email or web address]  Expires: 2017  3:49 PM     Your access code will  in 90 days. If you need help or a new code, please call your [facility] clinic or [phone].        Care EveryWhere ID     This is your Care EveryWhere ID. This could be used by other organizations to access your [facility] medical records  [id number]         Blood Pressure from Last 3 Encounters:   17 100/58   14 102/67    Weight from Last 3 Encounters:   17 68.4 kg (150 lb 12.8 oz)   14 69.8 kg (153 lb 12.8 oz)              We Performed the Following     [facility] PROGRESS NOTES REPORT     NEUROMUSCULAR RE-EDUCATION     THERAPEUTIC EXERCISES        Primary Care Provider Office Phone # Fax #    Camilla Weber [phone]       [facility] [address] [facility] [address]        Thank you!     Thank you for choosing [facility] [place]  for your care. Our goal is always to provide you with excellent care. Hearing back from our patients is one way we can continue to improve our services. Please take a few minutes to complete the written survey that you may receive in the mail after your visit with us. Thank you!             Your Updated Medication List - Protect others around you: Learn how to safely use, store and throw away your medicines at www.disposemymeds.org.          This list is accurate as of: 17 11:59 PM.  Always use your most recent med list.                   Brand Name Dispense Instructions for use    adapalene 0.1 % cream    DIFFERIN    45 g    Apply topically At Bedtime       Ciclopirox 8 % Kit    CICLOPIROX TREATMENT    " 1 kit    Externally apply 1 Application topically At Bedtime       clindamycin 1 % lotion    CLEOCIN T    60 mL    Apply topically daily       hydrocortisone 0.2 % cream    WESTCORT    45 g    Apply topically 2 times daily Up to 2 weeks to red areas on upper arms, up to 2 weeks.       PRENATAL ADVANTAGE PO      Take by mouth daily       tretinoin 0.05 % cream    RETIN-A    45 g    Spread a pea size amount into affected area topically at bedtime.  Use sunscreen SPF>20.

## 2017-04-14 NOTE — PROGRESS NOTES
[facility]  System  Physical Exam  General   ROS      DISCHARGE REPORT    Progress reporting period is from 1/13/2017 to 4/14/2017.       SUBJECTIVE  Subjective: Pt reporting pain is much improved. She has returned to her exercise classes. She continues to have some discomfort with intercourse but not so much that it limits activity.     Current Pain level:  (discomfort along L side of introitus).     Initial Pain level: 7/10.   Changes in function:  Yes (See Goal flowsheet attached for changes in current functional level)  Adverse reaction to treatment or activity: None    OBJECTIVE  Changes noted in objective findings:  Yes, improved pelvic floor resting tone and strength  Pelvic floor strength: 2+- 3/5; Pt continuing with strengthening exercises  Pelvic floor endurance: 5s    Objective: Improved strength in pelvic floor, extra time needed for pelvic floor relaxation, improving pelvic floor strength     ASSESSMENT/PLAN    STG/LTGs have been met or progress has been made towards goals:  Yes (See Goal flow sheet completed today.)  Assessment of Progress: The patient's condition is improving.  Self Management Plans:  Patient has been instructed in a home treatment program.  Ceci continues to require the following intervention to meet STG and LTG's:  PT intervention is no longer required to meet STG/LTG.    Recommendations:  This patient is ready to be discharged from therapy and continue their home treatment program.    Please refer to the daily flowsheet for treatment today, total treatment time and time spent performing 1:1 timed codes.

## 2017-04-14 NOTE — LETTER
[facility] PHYSICAL THERAPY [place] [id number]  [address]  [facility] [address]  [phone]    May 5, 2017    Re: Ceci Major   :   1985  MRN:  [id number]   REFERRING PHYSICIAN:   Camilla Weber    [facility] PHYSICAL [facility]    Date of Initial Evaluation:  2017  Visits:  Rxs Used: 7  Reason for Referral:     Somatic dysfunction of pelvic region  Female stress incontinence  Dyspareunia, female    EVALUATION SUMMARY    DISCHARGE REPORT    Progress reporting period is from 2017 to 2017.       SUBJECTIVE  Subjective: Pt reporting pain is much improved. She has returned to her exercise classes. She continues to have some discomfort with intercourse but not so much that it limits activity.     Current Pain level:  (discomfort along L side of introitus).     Initial Pain level: 7/10.   Changes in function:  Yes (See Goal flowsheet attached for changes in current functional level)  Adverse reaction to treatment or activity: None    OBJECTIVE  Changes noted in objective findings:  Yes, improved pelvic floor resting tone and strength  Pelvic floor strength: 2+- 3/5; Pt continuing with strengthening exercises  Pelvic floor endurance: 5s    Objective: Improved strength in pelvic floor, extra time needed for pelvic floor relaxation, improving pelvic floor strength     ASSESSMENT/PLAN    STG/LTGs have been met or progress has been made towards goals:  Yes (See Goal flow sheet completed today.)  Assessment of Progress: The patient's condition is improving.  Self Management Plans:  Patient has been instructed in a home treatment program.  Ceci continues to require the following intervention to meet STG and LTG's:  PT intervention is no longer required to meet STG/LTG.    Recommendations:  This patient is ready to be discharged from therapy and continue their home treatment program.    Please refer to the daily flowsheet for treatment today,  total treatment time and time spent performing 1:1 timed codes.                  Thank you for your referral.    INQUIRIES  Therapist: Lara Hargrove, PT, DPT  [facility] [address] [address]  Phone: [phone]  Fax: [phone]

## 2017-04-27 ENCOUNTER — THERAPY VISIT (OUTPATIENT)
Dept: OCCUPATIONAL THERAPY | Facility: CLINIC | Age: 32
End: 2017-04-27
Payer: COMMERCIAL

## 2017-04-27 DIAGNOSIS — M77.8 LEFT WRIST TENDONITIS: ICD-10-CM

## 2017-04-27 DIAGNOSIS — M65.4 RADIAL STYLOID TENOSYNOVITIS: ICD-10-CM

## 2017-04-27 DIAGNOSIS — M25.532 LEFT WRIST PAIN: ICD-10-CM

## 2017-04-27 PROCEDURE — 29125 APPL SHORT ARM SPLINT STATIC: CPT | Mod: GO | Performed by: OCCUPATIONAL THERAPIST

## 2017-04-27 PROCEDURE — 97110 THERAPEUTIC EXERCISES: CPT | Mod: GO | Performed by: OCCUPATIONAL THERAPIST

## 2017-04-27 NOTE — MR AVS SNAPSHOT
"              After Visit Summary   4/27/2017    Ceci Major    MRN: [id number]           Patient Information     Date Of Birth          1985        Visit Information        Provider Department      4/27/2017 8:00 AM Lona Beatty OT [facility]        Today's Diagnoses     Left wrist pain        Left wrist tendonitis        Radial styloid tenosynovitis           Follow-ups after your visit        Your next 10 appointments already scheduled     May 02, 2017  2:00 PM CDT   [facility] Hand with Lona Beatty OT   [facility] ([facility])    [address] [place] [address] [phone]            May 11, 2017  8:00 AM CDT   [facility] Hand with Lona Beatyt OT   [facility] ([facility])    [address] [place] [address] [phone]              Who to contact     If you have questions or need follow up information about today's clinic visit or your schedule please contact [facility] directly at [phone].  Normal or non-critical lab and imaging results will be communicated to you by [employer]hart, letter or phone within 4 business days after the clinic has received the results. If you do not hear from us within 7 days, please contact the clinic through [employer] or phone. If you have a critical or abnormal lab result, we will notify you by phone as soon as possible.  Submit refill requests through [employer] or call your pharmacy and they will forward the refill request to us. Please allow 3 business days for your refill to be completed.          Additional Information About Your Visit        [employer] Survival Information     [employer] lets you send messages to your doctor, view your test results, renew your prescriptions, schedule appointments and more. To sign up, go to [email or web address] . Click on \"Log in\" on the left side of the screen, " "which will take you to the Welcome page. Then click on \"Sign up Now\" on the right side of the page.     You will be asked to enter the access code listed below, as well as some personal information. Please follow the directions to create your username and password.     Your access code is: [email or web address]  Expires: 2017  3:49 PM     Your access code will  in 90 days. If you need help or a new code, please call your [facility] clinic or [phone].        Care EveryWhere ID     This is your Care EveryWhere ID. This could be used by other organizations to access your [facility] medical records  [id number]         Blood Pressure from Last 3 Encounters:   17 100/58   14 102/67    Weight from Last 3 Encounters:   17 68.4 kg (150 lb 12.8 oz)   14 69.8 kg (153 lb 12.8 oz)              We Performed the Following     APPLY SHORT ARM SPLINT STATIC     THERAPEUTIC EXERCISES        Primary Care Provider Office Phone # Fax #    Camilla Weber -[phone]       [facility] [address] [place] [id number]        Thank you!     Thank you for choosing [facility]  for your care. Our goal is always to provide you with excellent care. Hearing back from our patients is one way we can continue to improve our services. Please take a few minutes to complete the written survey that you may receive in the mail after your visit with us. Thank you!             Your Updated Medication List - Protect others around you: Learn how to safely use, store and throw away your medicines at www.disposemymeds.org.          This list is accurate as of: 17 10:02 AM.  Always use your most recent med list.                   Brand Name Dispense Instructions for use    adapalene 0.1 % cream    DIFFERIN    45 g    Apply topically At Bedtime       Ciclopirox 8 % Kit    CICLOPIROX TREATMENT    1 kit    Externally apply 1 Application topically At Bedtime       clindamycin " 1 % lotion    CLEOCIN T    60 mL    Apply topically daily       hydrocortisone 0.2 % cream    WESTCORT    45 g    Apply topically 2 times daily Up to 2 weeks to red areas on upper arms, up to 2 weeks.       PRENATAL ADVANTAGE PO      Take by mouth daily       tretinoin 0.05 % cream    RETIN-A    45 g    Spread a pea size amount into affected area topically at bedtime.  Use sunscreen SPF>20.

## 2017-04-27 NOTE — PROGRESS NOTES
Please refer to the daily flowsheet for treatment today, total treatment time and time spent performing 1:1 timed codes.

## 2017-12-01 DIAGNOSIS — L70.0 ACNE VULGARIS: ICD-10-CM

## 2017-12-01 NOTE — TELEPHONE ENCOUNTER
Last 9-12-16 no appts scheduled    Impression/Plan:  1. Inflamed Keratosis pilaris   - Continue Hydrocortisone valerate 0.2% cream bid up to two weeks, as needed  - Stop tretinoin 0.05% cream - due to pregnancy  - Sun precaution was advised including the use of sun screens of SPF 30 or higher, sun protective clothing, and avoidance of tanning beds.     2. Acne vulgaris  - Continue clindamycin 1% lotion 1-2 times daily to affected areas  - Once pregnancy and breastfeeding are finished, start tretinoin 0.05% cream  .  3. Melasma  - Discussion of over the counter, hydroquinone     4. Mild tinea pedis with likely onychomycosis   - Discussion of 50% vinegar, 50% water once a day for 15 minutes or Tea Tree oil  - Discussion of an anti fungal medication after pregnancy           Follow-up 1-2 years after pregnancy and lactation completion, earlier for new or changing lesions.

## 2017-12-04 RX ORDER — CLINDAMYCIN PHOSPHATE 10 UG/ML
LOTION TOPICAL DAILY
Qty: 60 ML | Refills: 3 | Status: SHIPPED | OUTPATIENT
Start: 2017-12-04

## 2019-01-10 ENCOUNTER — THERAPY VISIT (OUTPATIENT)
Dept: PHYSICAL THERAPY | Facility: CLINIC | Age: 34
End: 2019-01-10
Payer: COMMERCIAL

## 2019-01-10 DIAGNOSIS — N39.3 FEMALE STRESS INCONTINENCE: ICD-10-CM

## 2019-01-10 DIAGNOSIS — M99.05 SOMATIC DYSFUNCTION OF PELVIC REGION: Primary | ICD-10-CM

## 2019-01-10 DIAGNOSIS — R10.2 PELVIC PAIN IN FEMALE: ICD-10-CM

## 2019-01-10 PROCEDURE — 97161 PT EVAL LOW COMPLEX 20 MIN: CPT | Mod: GP | Performed by: PHYSICAL THERAPIST

## 2019-01-10 PROCEDURE — 97530 THERAPEUTIC ACTIVITIES: CPT | Mod: GP | Performed by: PHYSICAL THERAPIST

## 2019-01-10 PROCEDURE — 97112 NEUROMUSCULAR REEDUCATION: CPT | Mod: GP | Performed by: PHYSICAL THERAPIST

## 2019-01-10 PROCEDURE — 97110 THERAPEUTIC EXERCISES: CPT | Mod: GP | Performed by: PHYSICAL THERAPIST

## 2019-01-10 NOTE — PROGRESS NOTES
[facility]  System  Physical Exam  General   ROS        Physical Therapy Initial Examination/Evaluation January 10, 2019   Ceci Major is a 33 year old female referred to physical therapy by Dr. Camilla Weber for treatment of Weak pelvic floor, urinary incontinence  with Precautions/Restrictions/MD instructions E&T   Therapist Assessment:   Clinical Impression: Pt presents to [facility] with primary complaint of slight stress incontinence and pelvic pain with intercourse.  Per clinical examination, pt with sensitivity along L side introitus near area of initial episiotomy.  Weakness noted in pelvic floor with increased time needed for relaxation but improved from after previous PT sessions.    Pt will benefit from skilled physical therapy to improve control of pelvic floor as well as stabilization program post-partum   Subjective: Pt reports that second labor/delivery went very well. Pt is having some stress incontinence. [place] is slightly painful at area of episiotomy but starting to improve already.  She would like to return to exercise but wants to make sure she is doing correct exercises.    DOI/onset: MD order 12/11/2018   Mechanism of injury: Pregnancy/childbirth    DOS NA   Related PMH: pelvic pain postpartum  Previous treatment: Pelvic PT    Imaging: NA   Chief Complaint: Pelvic Pain, stress incontinence   Pain: rest 0  /10, activity 3/10  Described as: tightness, burning Alleviated by: Stopping activity Exacerbated by: [place]  Progression of symptoms since initial onset: improving  Time of day when pain is worse: none noted   Sleeping: Wakes 1x/night to nurse and urinate   Social history: ; 2 year old Radha, 4 month old Jennifer    Occupation:  Job duties: Computer work, prolonged sitting    Current HEP/exercise regimen: Was going to barre with baby   Patient's goals are Decrease pain, increase physical activity, tools to help with  diastasis recti    Other pertinent PMH: none noted  General health as reported by patient: Good    Return to MD: prn      Urination:  Do you leak on the way to the bathroom or with a strong urge to void? No    Do you leak with cough,sneeze, jumping, running?Yes   Any other activities that cause leaking? No   Do you have triggers that make you feel you can't wait to go to the bathroom? No   Type of pad and number used per day? NA  When you leak what is the amount? Small     How long can you delay the need to urinate? Unsure   How many times do you get up to urinate at night?  1    Can you stop the flow of urine when on the toilet? Unsure  Is the volume of urine passed usually: average. (8sec rule=  250ml with average bladder storing  400-600ml)    Do you strain to pass urine? No  Do you have a slow or hesitant urinary stream? No  Do you have difficulty initiating the urine stream? No  Is urination painful?  No    How many bladder infections have you had in last 12 months? 0    Fluid intake(one glass is 8oz or one cup) 6 glasses/day, 8 caffinated glasses/day  NA alcohol glasses/day.    Bowel habits:  Frequency of bowel movements? 1 times a day  Consistancy of stool? hard  Do you ignore the urge to defecate? No  Do you strain to pass stool? Yes    Pelvic Pain:  Do you have any pelvic pain with intercourse, exams, use of tampons? Yes  Is initial penetration during intercourse painful? Yes  Is deeper penetration painful? No  Do you use lubricant?   Yes What kind? Uber lube      Given birth? Yes Any complications? Some tearing with most recent delivery; 1st delivery had episiotomy and vacuum delivery  # of vaginal delieveries? 2   # of C-sections? 0   # of episiotomies? 1  Are you sexually active?Yes  Have you ever been worried for your physical safety? No  Any abdominal or pelvic surgeries?  No  Are you having any regular exercise? Yes, barre classes  Have you practiced the PF(kegel) exercises for 4 or more weeks?  No      OBSERVATION    Introitus: Some tightness along L side at area of previous episiotomy       ROM  Single leg standing: WNL      MUSCLE PERFORMANCE    Baseline PF tone:hyper -more on L side  PF Tone with cough: hyper  Valsalva: hyper  PF Response quality: moderate  PF Power: Center: 2    Endurance: Maximum contraction in seconds: < 5 seconds  # of endurance contractions before fatigue: NT  Quick contraction repetitions prior to fatigue: Difficulty coordinating quick contraction due to increased difficulty with relaxation .  Specificity/accessory muscles: Adductors    Hip MMT 1/10/19 Left Right    Hip Flexion  4/5 4/5   Hip Abduction  4/5 4/5   Hip Extension  4-/5 4-/5           PALPATION: Some pain along L side introitus  0.5-1.0 finger width  diastasis recti above umbilicus     NMR (10 mins)  Biofeedback unit used to provide visualization of PF activation in multiple body positions including supine, sidelying, and sitting. Education provided on tone of pelvic floor and how this can affect symptoms.  Pt practicing contractions with focus on relaxation using verbal, manual, and visual cues.   Initiated education on proper body mechanics for lifting and the importance of core strength. Discussed activation of TA and performed contraction in both supine, quaruped,  and sitting working up to 20-30s holds. Pt provided with HEP.    Therapeutic Activity (15 min): Today's session consisted of education regarding pelvic floor muscle anatomy, normal bladder function, urge suppression techniques and/or relaxation techniques as indicated. Pt reviewed the pathoanatomy of the pelvic floor. l.  We discussed what pelvic floor physical therapy is, components of exam, and typical patient progression.  Pt was told that she was in control of exam progression, and if at any time was uncomfortable and wished to discontinue we could.     Assessment/Plan:    Patient is a 33 year old female with pelvic complaints.    Patient has the  following significant findings with corresponding treatment plan.                Diagnosis 1:  Pelvic Pain, stress incontinence  Pain -  manual therapy, self management, education and home program  Decreased ROM/flexibility - manual therapy, therapeutic exercise, therapeutic activity and home program  Decreased strength - therapeutic exercise, therapeutic activities and home program  Impaired muscle performance - biofeedback, neuro re-education and home program  Decreased function - therapeutic activities and home program    Therapy Evaluation Codes:   1) History comprised of:   Personal factors that impact the plan of care:      Past/current experiences and Time since onset of symptoms.    Comorbidity factors that impact the plan of care are:      Weakness.     Medications impacting care: None.  2) Examination of Body Systems comprised of:   Body structures and functions that impact the plan of care:      Pelvis.   Activity limitations that impact the plan of care are:      [place], Stress incontinence and Urinary incontinence.  3) Clinical presentation characteristics are:   Stable/Uncomplicated.  4) Decision-Making    Low complexity using standardized patient assessment instrument and/or measureable assessment of functional outcome.  Cumulative Therapy Evaluation is: Low complexity.    Previous and current functional limitations:  (See Goal Flow Sheet for this information)    Short term and Long term goals: (See Goal Flow Sheet for this information)     Communication ability:  Patient appears to be able to clearly communicate and understand verbal and written communication and follow directions correctly.  Treatment Explanation - The following has been discussed with the patient:   RX ordered/plan of care  Anticipated outcomes  Possible risks and side effects  This patient would benefit from PT intervention to resume normal activities.   Rehab potential is good.    Frequency:  2 X a month, once  daily  Duration:  for 2 months  Discharge Plan:  Achieve all LTG.  Independent in home treatment program.  Reach maximal therapeutic benefit.    Please refer to the daily flowsheet for treatment today, total treatment time and time spent performing 1:1 timed codes.

## 2019-01-10 NOTE — LETTER
[facility] PHYSICAL THERAPY  [address] [facility] [address]  [phone]    2019    Re: Ceci Major   :   1985  MRN:  [id number]   REFERRING PHYSICIAN:   Camilla Weber    [facility] ATHLETIC [facility] PHYSICAL [facility]  Date of Initial Evaluation: 1/10/19  Visits:  Rxs Used: 1  Reason for Referral:     Pelvic pain in female  Somatic dysfunction of pelvic region  Female stress incontinence    Physical Therapy Initial Examination/Evaluation January 10, 2019   Ceci Major is a 33 year old female referred to physical therapy by Dr. Camilla Weber for treatment of Weak pelvic floor, urinary incontinence  with Precautions/Restrictions/MD instructions E&T   Therapist Assessment:   Clinical Impression: Pt presents to [facility] with primary complaint of slight stress incontinence and pelvic pain with intercourse.  Per clinical examination, pt with sensitivity along L side introitus near area of initial episiotomy.  Weakness noted in pelvic floor with increased time needed for relaxation but improved from after previous PT sessions.    Pt will benefit from skilled physical therapy to improve control of pelvic floor as well as stabilization program post-partum   Subjective: Pt reports that second labor/delivery went very well. Pt is having some stress incontinence. [place] is slightly painful at area of episiotomy but starting to improve already.  She would like to return to exercise but wants to make sure she is doing correct exercises.   DOI/onset: MD order 2018   Mechanism of injury: Pregnancy/childbirth    DOS NA   Related PMH: pelvic pain postpartum  Previous treatment: Pelvic PT    Imaging: NA   Chief Complaint: Pelvic Pain, stress incontinence   Pain: rest 0  /10, activity 3/10  Described as: tightness, burning Alleviated by: Stopping activity Exacerbated by: [place]   Progression of symptoms since initial onset: improving  Time of day when pain is worse: none noted   Sleeping: Wakes 1x/night to nurse and urinate   Social history: ; 2 year old Radha, 4 month old Jennifer    Re: Ceci Major   :   1985  Occupation:  Job duties: Computer work, prolonged sitting    Current HEP/exercise regimen: Was going to barre with baby   Patient's goals are Decrease pain, increase physical activity, tools to help with diastasis recti    Other pertinent PMH: none noted  General health as reported by patient: Good    Return to MD: prn    Urination:  Do you leak on the way to the bathroom or with a strong urge to void? No    Do you leak with cough,sneeze, jumping, running?Yes   Any other activities that cause leaking? No   Do you have triggers that make you feel you can't wait to go to the bathroom? No   Type of pad and number used per day? NA  When you leak what is the amount? Small   How long can you delay the need to urinate? Unsure   How many times do you get up to urinate at night?  1    Can you stop the flow of urine when on the toilet? Unsure  Is the volume of urine passed usually: average. (8sec rule=  250ml with average bladder storing  400-600ml)  Do you strain to pass urine? No  Do you have a slow or hesitant urinary stream? No  Do you have difficulty initiating the urine stream? No  Is urination painful?  No  How many bladder infections have you had in last 12 months? 0  Fluid intake(one glass is 8oz or one cup) 6 glasses/day, 8 caffinated glasses/day  NA alcohol glasses/day.    Bowel habits:  Frequency of bowel movements? 1 times a day  Consistancy of stool? hard  Do you ignore the urge to defecate? No  Do you strain to pass stool? Yes    Pelvic Pain:  Do you have any pelvic pain with intercourse, exams, use of tampons? Yes  Is initial penetration during intercourse painful? Yes  Is deeper penetration painful? No  Do you use lubricant?    Yes What kind? Uber lube  Given birth? Yes Any complications? Some tearing with most recent delivery; 1st delivery had episiotomy and vacuum delivery  # of vaginal delieveries? 2   # of C-sections? 0   # of episiotomies? 1  Are you sexually active?Yes  Have you ever been worried for your physical safety? No  Any abdominal or pelvic surgeries?  No  Are you having any regular exercise? Yes, barre classes  Have you practiced the PF(kegel) exercises for 4 or more weeks? No    OBSERVATION  Introitus: Some tightness along L side at area of previous episiotomy   Single leg standing: WNL  Re: Ceci Major   :   1985      MUSCLE PERFORMANCE  Baseline PF tone:hyper -more on L side  PF Tone with cough: hyper  Valsalva: hyper  PF Response quality: moderate  PF Power: Center: 2    Endurance: Maximum contraction in seconds: < 5 seconds  # of endurance contractions before fatigue: NT  Quick contraction repetitions prior to fatigue: Difficulty coordinating quick contraction due to increased difficulty with relaxation .  Specificity/accessory muscles: Adductors    Hip MMT 1/10/19 Left Right    Hip Flexion  4/5 4/5   Hip Abduction  4/5 4/5   Hip Extension  4-/5 4-/5     PALPATION: Some pain along L side introitus  0.5-1.0 finger width  diastasis recti above umbilicus   NMR (10 mins)  Biofeedback unit used to provide visualization of PF activation in multiple body positions including supine, sidelying, and sitting. Education provided on tone of pelvic floor and how this can affect symptoms.  Pt practicing contractions with focus on relaxation using verbal, manual, and visual cues.   Initiated education on proper body mechanics for lifting and the importance of core strength. Discussed activation of TA and performed contraction in both supine, quaruped,  and sitting working up to 20-30s holds. Pt provided with HEP.  Therapeutic Activity (15 min): Today's session consisted of education regarding pelvic floor muscle  anatomy, normal bladder function, urge suppression techniques and/or relaxation techniques as indicated. Pt reviewed the pathoanatomy of the pelvic floor. l.  We discussed what pelvic floor physical therapy is, components of exam, and typical patient progression.  Pt was told that she was in control of exam progression, and if at any time was uncomfortable and wished to discontinue we could.   Assessment/Plan:    Patient is a 33 year old female with pelvic complaints.    Patient has the following significant findings with corresponding treatment plan.                Diagnosis 1:  Pelvic Pain, stress incontinence  Pain -  manual therapy, self management, education and home program  Decreased ROM/flexibility - manual therapy, therapeutic exercise, therapeutic activity and home program  Decreased strength - therapeutic exercise, therapeutic activities and home program  Impaired muscle performance - biofeedback, neuro re-education and home program  Decreased function - therapeutic activities and home program    Therapy Evaluation Codes:   1) History comprised of:   Personal factors that impact the plan of care:      Past/current experiences and Time since onset of symptoms.    Comorbidity factors that impact the plan of care are:      Weakness.     Medications impacting care: None.      Re: Ceci Major   :   1985    2) Examination of Body Systems comprised of:   Body structures and functions that impact the plan of care:      Pelvis.   Activity limitations that impact the plan of care are:      [place], Stress incontinence and Urinary incontinence.  3) Clinical presentation characteristics are:   Stable/Uncomplicated.  4) Decision-Making    Low complexity using standardized patient assessment instrument and/or measureable assessment of functional outcome.  Cumulative Therapy Evaluation is: Low complexity.  Previous and current functional limitations:  (See Goal Flow Sheet for this information)     Short term and Long term goals: (See Goal Flow Sheet for this information)   Communication ability:  Patient appears to be able to clearly communicate and understand verbal and written communication and follow directions correctly.  Treatment Explanation - The following has been discussed with the patient:   RX ordered/plan of care  Anticipated outcomes  Possible risks and side effects  This patient would benefit from PT intervention to resume normal activities.   Rehab potential is good.  Frequency:  2 X a month, once daily  Duration:  for 2 months  Discharge Plan:  Achieve all LTG.  Independent in home treatment program.  Reach maximal therapeutic benefit.    Thank you for your referral.    INQUIRIES  Therapist:Lara Hargrove   [facility] PHYSICAL THERAP[facility]  [address] [place] [id number]  Phone: [phone]  Fax: [phone]

## 2019-02-21 ENCOUNTER — THERAPY VISIT (OUTPATIENT)
Dept: PHYSICAL THERAPY | Facility: CLINIC | Age: 34
End: 2019-02-21
Payer: COMMERCIAL

## 2019-02-21 DIAGNOSIS — R10.2 PELVIC PAIN IN FEMALE: ICD-10-CM

## 2019-02-21 PROCEDURE — 97110 THERAPEUTIC EXERCISES: CPT | Mod: GP | Performed by: PHYSICAL THERAPIST

## 2019-02-21 PROCEDURE — 97140 MANUAL THERAPY 1/> REGIONS: CPT | Mod: GP | Performed by: PHYSICAL THERAPIST

## 2019-10-04 ENCOUNTER — OFFICE VISIT (OUTPATIENT)
Dept: OPHTHALMOLOGY | Facility: CLINIC | Age: 34
End: 2019-10-04
Payer: COMMERCIAL

## 2019-10-04 DIAGNOSIS — H52.03 HYPERMETROPIA OF BOTH EYES: Primary | ICD-10-CM

## 2019-10-04 ASSESSMENT — EXTERNAL EXAM - LEFT EYE: OS_EXAM: NORMAL

## 2019-10-04 ASSESSMENT — TONOMETRY
OD_IOP_MMHG: 12
IOP_METHOD: ICARE
OS_IOP_MMHG: 11

## 2019-10-04 ASSESSMENT — CONF VISUAL FIELD
OS_NORMAL: 1
OD_NORMAL: 1
METHOD: COUNTING FINGERS

## 2019-10-04 ASSESSMENT — VISUAL ACUITY
METHOD: SNELLEN - LINEAR
OD_CC: 20/20
OS_CC: 20/20
CORRECTION_TYPE: GLASSES

## 2019-10-04 ASSESSMENT — REFRACTION_MANIFEST
OD_CYLINDER: +0.25
OS_SPHERE: +0.25
OD_SPHERE: +0.50
OD_AXIS: 032
OS_AXIS: 169
OS_CYLINDER: +0.25

## 2019-10-04 ASSESSMENT — REFRACTION_WEARINGRX
OD_SPHERE: +0.75
OS_AXIS: 179
OD_CYLINDER: +0.25
OS_SPHERE: +0.50
OS_CYLINDER: +0.25
SPECS_TYPE: SVL
OD_AXIS: 038

## 2019-10-04 ASSESSMENT — SLIT LAMP EXAM - LIDS
COMMENTS: NORMAL
COMMENTS: NORMAL

## 2019-10-04 ASSESSMENT — CUP TO DISC RATIO
OD_RATIO: 0.2
OS_RATIO: 0.2

## 2019-10-04 ASSESSMENT — EXTERNAL EXAM - RIGHT EYE: OD_EXAM: NORMAL

## 2019-10-04 NOTE — NURSING NOTE
Chief Complaints and History of Present Illnesses   Patient presents with     Annual Eye Exam     Chief Complaint(s) and History of Present Illness(es)     Annual Eye Exam     Laterality: both eyes    Onset: gradual    Onset: months ago    Frequency: constantly    Course: stable    Associated symptoms: Negative for eye pain    Treatments tried: no treatments    Pain scale: 0/10              Comments     Eyes taking a long time to focus in AM for last 4 months since getting pink eye (which has since resolved). Does not wear glassed when on the phone. Patient denies eye pain or irritation. Does not use any eye drops.    Ronda Stoner COT 12:25 PM October 4, 2019

## 2019-10-04 NOTE — PROGRESS NOTES
Assessment/Plan  (H52.03) Hypermetropia of both eyes  (primary encounter diagnosis)  Comment: Very mild dryness also present  Plan: REFRACTION [66454]        Encouraged more full time use of glasses. Limit screen time before bed. Plan on monitoring every 1-2 years.       Complete documentation of historical and exam elements from today's encounter can  be found in the full encounter summary report (not reduplicated in this progress  note). I personally obtained the chief complaint(s) and history of present illness. I  confirmed and edited as necessary the review of systems, past medical/surgical  history, family history, social history, and examination findings as documented by  others; and I examined the patient myself. I personally reviewed the relevant tests,  images, and reports as documented above. I formulated and edited as necessary the  assessment and plan and discussed the findings and management plan with the  patient and family.    Barrington Doty, OD

## 2019-11-07 ENCOUNTER — HEALTH MAINTENANCE LETTER (OUTPATIENT)
Age: 34
End: 2019-11-07

## 2019-11-14 NOTE — PROGRESS NOTES
Discharge Note    Progress reporting period is from last progress note on Ameya 10  to Feb 21, 2019.    Ceci failed to follow up and current status is unknown.  Please see information below for last relevant information on current status.  Patient seen for 2 visits.    SUBJECTIVE  Subjective changes noted by patient:  Pt reports that exercises are difficult to get to. [place] is mildly painful.   .  Current pain level is  .     Previous pain level was  4/10.   Changes in function:  Yes (See Goal flowsheet attached for changes in current functional level)  Adverse reaction to treatment or activity: None    OBJECTIVE  Changes noted in objective findings: Continued education for stretching at home; modified HEP so that it is more functional for patient to perform at home and work      ASSESSMENT/PLAN  Diagnosis: Stress Incontinence, Pelvic Pain   Updated problem list and treatment plan:   Pain - HEP  Decreased ROM/flexibility - HEP  Decreased function - HEP  Decreased strength - HEP  Impaired muscle performance - HEP  STG/LTGs have been met or progress has been made towards goals:  Yes, please see goal flowsheet for most current information  Assessment of Progress: current status is unknown.    Last current status: Pt is progressing as expected   Self Management Plans:  HEP  I have re-evaluated this patient and find that the nature, scope, duration and intensity of the therapy is appropriate for the medical condition of the patient.  Ceci continues to require the following intervention to meet STG and LTG's:  HEP.    Recommendations:  Discharge with current home program.  Patient to follow up with MD as needed.    Please refer to the daily flowsheet for treatment today, total treatment time and time spent performing 1:1 timed codes.

## 2019-12-03 PROBLEM — R10.2 PELVIC PAIN IN FEMALE: Status: RESOLVED | Noted: 2019-01-10 | Resolved: 2019-02-28

## 2020-11-29 ENCOUNTER — HEALTH MAINTENANCE LETTER (OUTPATIENT)
Age: 35
End: 2020-11-29

## 2021-09-25 ENCOUNTER — HEALTH MAINTENANCE LETTER (OUTPATIENT)
Age: 36
End: 2021-09-25

## 2022-01-15 ENCOUNTER — HEALTH MAINTENANCE LETTER (OUTPATIENT)
Age: 37
End: 2022-01-15

## 2022-12-26 ENCOUNTER — HEALTH MAINTENANCE LETTER (OUTPATIENT)
Age: 37
End: 2022-12-26

## 2023-04-16 ENCOUNTER — HEALTH MAINTENANCE LETTER (OUTPATIENT)
Age: 38
End: 2023-04-16

## 2024-06-23 ENCOUNTER — HEALTH MAINTENANCE LETTER (OUTPATIENT)
Age: 39
End: 2024-06-23